# Patient Record
Sex: MALE | Race: WHITE
[De-identification: names, ages, dates, MRNs, and addresses within clinical notes are randomized per-mention and may not be internally consistent; named-entity substitution may affect disease eponyms.]

---

## 2017-02-23 ENCOUNTER — HOSPITAL ENCOUNTER (OUTPATIENT)
Dept: HOSPITAL 45 - C.RDSM | Age: 72
Discharge: HOME | End: 2017-02-23
Attending: ORTHOPAEDIC SURGERY
Payer: COMMERCIAL

## 2017-02-23 DIAGNOSIS — M25.511: Primary | ICD-10-CM

## 2017-06-27 ENCOUNTER — HOSPITAL ENCOUNTER (OUTPATIENT)
Dept: HOSPITAL 45 - C.ULTR | Age: 72
Discharge: HOME | End: 2017-06-27
Attending: FAMILY MEDICINE
Payer: COMMERCIAL

## 2017-06-27 DIAGNOSIS — R19.09: Primary | ICD-10-CM

## 2017-06-27 DIAGNOSIS — K40.90: ICD-10-CM

## 2017-06-27 NOTE — DIAGNOSTIC IMAGING REPORT
ABDOMEN ULTRASOUND FOR HERNIA



CLINICAL HISTORY: EVAL FOR HERNIA RECURRENCE,BULDGE   



COMPARISON STUDY:  Abdomen and pelvis CT 11/24/2014.



FINDINGS: There is mesh within the left inguinal canal consistent with prior

hernia repair. There is a tiny fat-containing inguinal hernia adjacent to the

mesh. This is partially reducible. There is no bowel identified within the

hernia.



IMPRESSION:  Tiny fat-containing partially reducible left inguinal hernia

adjacent to the mesh from the prior inguinal hernia repair. 









Electronically signed by:  Yuan Moore M.D.

6/27/2017 10:53 AM



Dictated Date/Time:  6/27/2017 10:37 AM

## 2017-07-19 ENCOUNTER — HOSPITAL ENCOUNTER (OUTPATIENT)
Dept: HOSPITAL 45 - C.LAB | Age: 72
Discharge: HOME | End: 2017-07-19
Payer: COMMERCIAL

## 2017-07-19 DIAGNOSIS — N52.9: ICD-10-CM

## 2017-07-19 DIAGNOSIS — N39.0: ICD-10-CM

## 2017-07-19 DIAGNOSIS — C61: Primary | ICD-10-CM

## 2017-08-30 ENCOUNTER — HOSPITAL ENCOUNTER (OUTPATIENT)
Dept: HOSPITAL 45 - C.LAB | Age: 72
Discharge: HOME | End: 2017-08-30
Attending: UROLOGY
Payer: COMMERCIAL

## 2017-08-30 DIAGNOSIS — C61: Primary | ICD-10-CM

## 2018-07-26 ENCOUNTER — HOSPITAL ENCOUNTER (OUTPATIENT)
Dept: HOSPITAL 45 - C.LAB | Age: 73
Discharge: HOME | End: 2018-07-26
Attending: UROLOGY
Payer: COMMERCIAL

## 2018-07-26 DIAGNOSIS — Z00.00: Primary | ICD-10-CM
